# Patient Record
Sex: MALE | Race: WHITE
[De-identification: names, ages, dates, MRNs, and addresses within clinical notes are randomized per-mention and may not be internally consistent; named-entity substitution may affect disease eponyms.]

---

## 2018-02-04 ENCOUNTER — HOSPITAL ENCOUNTER (EMERGENCY)
Dept: HOSPITAL 4 - SED | Age: 23
Discharge: HOME | End: 2018-02-04
Payer: MEDICAID

## 2018-02-04 VITALS — SYSTOLIC BLOOD PRESSURE: 145 MMHG

## 2018-02-04 VITALS — WEIGHT: 260 LBS | BODY MASS INDEX: 37.22 KG/M2 | HEIGHT: 70 IN

## 2018-02-04 VITALS — SYSTOLIC BLOOD PRESSURE: 154 MMHG

## 2018-02-04 DIAGNOSIS — R10.13: Primary | ICD-10-CM

## 2018-02-04 DIAGNOSIS — F12.10: ICD-10-CM

## 2018-02-04 LAB
ALBUMIN SERPL BCP-MCNC: 4.3 G/DL (ref 3.4–4.8)
ALT SERPL W P-5'-P-CCNC: 38 U/L (ref 12–78)
ANION GAP SERPL CALCULATED.3IONS-SCNC: 8 MMOL/L (ref 5–15)
APPEARANCE UR: CLEAR
AST SERPL W P-5'-P-CCNC: 17 U/L (ref 10–37)
BACTERIA URNS QL MICRO: (no result) /HPF
BASOPHILS # BLD AUTO: 0.3 K/UL (ref 0–0.2)
BASOPHILS NFR BLD AUTO: 2.8 % (ref 0–2)
BILIRUB SERPL-MCNC: 1.6 MG/DL (ref 0–1)
BILIRUB UR QL STRIP: (no result)
BUN SERPL-MCNC: 18 MG/DL (ref 8–21)
CALCIUM SERPL-MCNC: 10 MG/DL (ref 8.4–11)
CHLORIDE SERPL-SCNC: 102 MMOL/L (ref 98–107)
COLOR UR: YELLOW
CREAT SERPL-MCNC: 0.86 MG/DL (ref 0.55–1.3)
EOSINOPHIL # BLD AUTO: 0 K/UL (ref 0–0.4)
EOSINOPHIL NFR BLD AUTO: 0.4 % (ref 0–4)
ERYTHROCYTE [DISTWIDTH] IN BLOOD BY AUTOMATED COUNT: 12.7 % (ref 9–15)
GFR SERPL CREATININE-BSD FRML MDRD: 142 ML/MIN (ref 90–?)
GLUCOSE SERPL-MCNC: 100 MG/DL (ref 70–99)
GLUCOSE UR STRIP-MCNC: NEGATIVE MG/DL
HCT VFR BLD AUTO: 53.5 % (ref 36–54)
HGB BLD-MCNC: 17.3 G/DL (ref 14–18)
HGB UR QL STRIP: (no result)
KETONES UR STRIP-MCNC: NEGATIVE MG/DL
LEUKOCYTE ESTERASE UR QL STRIP: NEGATIVE
LIPASE SERPL-CCNC: 85 U/L (ref 73–393)
LYMPHOCYTES # BLD AUTO: 1.4 K/UL (ref 1–5.5)
LYMPHOCYTES NFR BLD AUTO: 12 % (ref 20.5–51.5)
MCH RBC QN AUTO: 28 PG (ref 27–31)
MCHC RBC AUTO-ENTMCNC: 32 % (ref 32–36)
MCV RBC AUTO: 86 FL (ref 79–98)
MONOCYTES # BLD MANUAL: 0.6 K/UL (ref 0–1)
MONOCYTES # BLD MANUAL: 5.2 % (ref 1.7–9.3)
MUCOUS THREADS URNS QL MICRO: (no result) /LPF
NEUTROPHILS # BLD AUTO: 9.5 K/UL (ref 1.8–7.7)
NEUTROPHILS NFR BLD AUTO: 79.6 % (ref 40–70)
NITRITE UR QL STRIP: NEGATIVE
PH UR STRIP: 5.5 [PH] (ref 5–8)
PLATELET # BLD AUTO: 287 K/UL (ref 130–430)
POTASSIUM SERPL-SCNC: 4 MMOL/L (ref 3.5–5.1)
PROT UR QL STRIP: NEGATIVE
RBC # BLD AUTO: 6.21 MIL/UL (ref 4.2–6.2)
RBC #/AREA URNS HPF: (no result) /HPF (ref 0–3)
SODIUM SERPLBLD-SCNC: 135 MMOL/L (ref 136–145)
SP GR UR STRIP: 1.02 (ref 1–1.03)
UROBILINOGEN UR STRIP-MCNC: 0.2 MG/DL (ref 0.2–1)
WBC # BLD AUTO: 11.8 K/UL (ref 4.8–10.8)
WBC #/AREA URNS HPF: (no result) /HPF (ref 0–3)

## 2018-02-04 PROCEDURE — 83690 ASSAY OF LIPASE: CPT

## 2018-02-04 PROCEDURE — 80053 COMPREHEN METABOLIC PANEL: CPT

## 2018-02-04 PROCEDURE — 74176 CT ABD & PELVIS W/O CONTRAST: CPT

## 2018-02-04 PROCEDURE — 96361 HYDRATE IV INFUSION ADD-ON: CPT

## 2018-02-04 PROCEDURE — 85025 COMPLETE CBC W/AUTO DIFF WBC: CPT

## 2018-02-04 PROCEDURE — 81000 URINALYSIS NONAUTO W/SCOPE: CPT

## 2018-02-04 PROCEDURE — 96375 TX/PRO/DX INJ NEW DRUG ADDON: CPT

## 2018-02-04 PROCEDURE — 99285 EMERGENCY DEPT VISIT HI MDM: CPT

## 2018-02-04 PROCEDURE — 96374 THER/PROPH/DIAG INJ IV PUSH: CPT

## 2018-02-04 PROCEDURE — 36415 COLL VENOUS BLD VENIPUNCTURE: CPT

## 2018-02-04 PROCEDURE — 82150 ASSAY OF AMYLASE: CPT

## 2018-02-04 PROCEDURE — 86710 INFLUENZA VIRUS ANTIBODY: CPT

## 2018-02-04 NOTE — NUR
Patient given written and verbal discharge instructions and verbalizes 
understanding.  ER MD discussed with patient the results and treatment 
provided. Patient in stable condition. ID arm band removed. IV catheter removed 
intact and dressing applied, no active bleeding.

Rx of ranitidine given. Patient educated on pain management and to follow up 
with PMD. Pain Scale 0.

Opportunity for questions provided and answered.

## 2018-02-04 NOTE — NUR
PT AAO X4 C/O ABD CRAMPING AND N/V INTERMITTENTLY X 7 DAYS. PT REPORTS VOMITING 
X4 FROM APPROX [phone].PT DENIES ABD CRAMPING OR NAUSEA AT THIS TIME. PT 
DENIES SIGNIFICANT MED HX.

## 2019-03-03 ENCOUNTER — HOSPITAL ENCOUNTER (EMERGENCY)
Dept: HOSPITAL 4 - SED | Age: 24
Discharge: HOME | End: 2019-03-03
Payer: MEDICAID

## 2019-03-03 VITALS — WEIGHT: 260 LBS | HEIGHT: 70 IN | BODY MASS INDEX: 37.22 KG/M2

## 2019-03-03 VITALS — SYSTOLIC BLOOD PRESSURE: 132 MMHG

## 2019-03-03 DIAGNOSIS — R03.0: ICD-10-CM

## 2019-03-03 DIAGNOSIS — R07.89: Primary | ICD-10-CM

## 2019-03-03 LAB
ALBUMIN SERPL BCP-MCNC: 3.4 G/DL (ref 3.4–4.8)
ALT SERPL W P-5'-P-CCNC: 56 U/L (ref 12–78)
ANION GAP SERPL CALCULATED.3IONS-SCNC: 7 MMOL/L (ref 5–15)
AST SERPL W P-5'-P-CCNC: 22 U/L (ref 10–37)
BASOPHILS # BLD AUTO: 0.1 K/UL (ref 0–0.2)
BASOPHILS NFR BLD AUTO: 0.8 % (ref 0–2)
BILIRUB SERPL-MCNC: 0.7 MG/DL (ref 0–1)
BUN SERPL-MCNC: 16 MG/DL (ref 8–21)
CALCIUM SERPL-MCNC: 9.2 MG/DL (ref 8.4–11)
CHLORIDE SERPL-SCNC: 103 MMOL/L (ref 98–107)
CREAT SERPL-MCNC: 1.01 MG/DL (ref 0.55–1.3)
EOSINOPHIL # BLD AUTO: 0.2 K/UL (ref 0–0.4)
EOSINOPHIL NFR BLD AUTO: 1.4 % (ref 0–4)
ERYTHROCYTE [DISTWIDTH] IN BLOOD BY AUTOMATED COUNT: 14.2 % (ref 9–15)
GFR SERPL CREATININE-BSD FRML MDRD: 117 ML/MIN (ref 90–?)
GLUCOSE SERPL-MCNC: 98 MG/DL (ref 70–99)
HCT VFR BLD AUTO: 47.7 % (ref 36–54)
HGB BLD-MCNC: 15.6 G/DL (ref 14–18)
LYMPHOCYTES # BLD AUTO: 3.1 K/UL (ref 1–5.5)
LYMPHOCYTES NFR BLD AUTO: 27 % (ref 20.5–51.5)
MCH RBC QN AUTO: 29 PG (ref 27–31)
MCHC RBC AUTO-ENTMCNC: 33 % (ref 32–36)
MCV RBC AUTO: 89 FL (ref 79–98)
MONOCYTES # BLD MANUAL: 1.1 K/UL (ref 0–1)
MONOCYTES # BLD MANUAL: 9.6 % (ref 1.7–9.3)
NEUTROPHILS # BLD AUTO: 7 K/UL (ref 1.8–7.7)
NEUTROPHILS NFR BLD AUTO: 61.2 % (ref 40–70)
PLATELET # BLD AUTO: 332 K/UL (ref 130–430)
POTASSIUM SERPL-SCNC: 4.1 MMOL/L (ref 3.5–5.1)
RBC # BLD AUTO: 5.38 MIL/UL (ref 4.2–6.2)
SODIUM SERPLBLD-SCNC: 139 MMOL/L (ref 136–145)
WBC # BLD AUTO: 11.5 K/UL (ref 4.8–10.8)

## 2019-03-03 PROCEDURE — 84484 ASSAY OF TROPONIN QUANT: CPT

## 2019-03-03 PROCEDURE — 96372 THER/PROPH/DIAG INJ SC/IM: CPT

## 2019-03-03 PROCEDURE — 99284 EMERGENCY DEPT VISIT MOD MDM: CPT

## 2019-03-03 PROCEDURE — 93005 ELECTROCARDIOGRAM TRACING: CPT

## 2019-03-03 PROCEDURE — 71045 X-RAY EXAM CHEST 1 VIEW: CPT

## 2019-03-03 PROCEDURE — 80053 COMPREHEN METABOLIC PANEL: CPT

## 2019-03-03 PROCEDURE — 36415 COLL VENOUS BLD VENIPUNCTURE: CPT

## 2019-03-03 PROCEDURE — 85025 COMPLETE CBC W/AUTO DIFF WBC: CPT
